# Patient Record
Sex: MALE | Race: WHITE | Employment: FULL TIME | URBAN - NONMETROPOLITAN AREA
[De-identification: names, ages, dates, MRNs, and addresses within clinical notes are randomized per-mention and may not be internally consistent; named-entity substitution may affect disease eponyms.]

---

## 2014-01-19 LAB

## 2014-05-31 LAB — HIV AG/AB: NORMAL

## 2019-05-10 LAB
AVERAGE GLUCOSE: NORMAL
CHOLESTEROL, TOTAL: 179 MG/DL
CHOLESTEROL/HDL RATIO: 2.26
HBA1C MFR BLD: 5.1 %
HDLC SERPL-MCNC: 79 MG/DL (ref 35–70)
LDL CHOLESTEROL CALCULATED: 89 MG/DL (ref 0–160)
TRIGL SERPL-MCNC: 51 MG/DL
VLDLC SERPL CALC-MCNC: 10 MG/DL

## 2019-05-15 LAB
CHOLESTEROL, TOTAL: 179 MG/DL
CHOLESTEROL/HDL RATIO: 2.26
HDLC SERPL-MCNC: 79 MG/DL (ref 35–70)
LDL CHOLESTEROL CALCULATED: 89 MG/DL (ref 0–160)
TRIGL SERPL-MCNC: 51 MG/DL
VLDLC SERPL CALC-MCNC: 10 MG/DL

## 2019-06-15 ENCOUNTER — TELEPHONE (OUTPATIENT)
Dept: PRIMARY CARE CLINIC | Age: 39
End: 2019-06-15

## 2019-06-15 NOTE — TELEPHONE ENCOUNTER
Patients wife stopped in stating that there was a form filled out for patient for his insurance and due to the way the form was filled out his life insurance was denied. Wife stated that patient does not have any health issues and was curious as to what was put on the form? There has not been any change in patients health since his prior life insurance policy was place.      Patients wife, Austin Hernandez, can be reached at   [phone]

## 2019-06-17 NOTE — TELEPHONE ENCOUNTER
I am sorry but I do not know - I would have to see the form again.  I do not understand why it was denied

## 2019-07-22 ENCOUNTER — TELEPHONE (OUTPATIENT)
Dept: FAMILY MEDICINE CLINIC | Age: 39
End: 2019-07-22

## 2019-07-22 DIAGNOSIS — R53.83 OTHER FATIGUE: ICD-10-CM

## 2019-07-22 DIAGNOSIS — Z82.49 FAM HX-ISCHEM HEART DISEASE: Primary | ICD-10-CM

## 2019-08-09 ENCOUNTER — OFFICE VISIT (OUTPATIENT)
Dept: FAMILY MEDICINE CLINIC | Age: 39
End: 2019-08-09
Payer: COMMERCIAL

## 2019-08-09 VITALS
HEART RATE: 74 BPM | TEMPERATURE: 98.2 F | BODY MASS INDEX: 25.87 KG/M2 | WEIGHT: 191 LBS | HEIGHT: 72 IN | DIASTOLIC BLOOD PRESSURE: 78 MMHG | OXYGEN SATURATION: 97 % | SYSTOLIC BLOOD PRESSURE: 138 MMHG

## 2019-08-09 DIAGNOSIS — M54.9 UPPER BACK PAIN: ICD-10-CM

## 2019-08-09 DIAGNOSIS — Z72.0 CHEWING TOBACCO USE: ICD-10-CM

## 2019-08-09 DIAGNOSIS — R07.0 THROAT PAIN: ICD-10-CM

## 2019-08-09 PROCEDURE — 99213 OFFICE O/P EST LOW 20 MIN: CPT | Performed by: INTERNAL MEDICINE

## 2019-08-09 RX ORDER — METHYLPREDNISOLONE 4 MG/1
TABLET ORAL
Qty: 1 KIT | Refills: 0 | Status: SHIPPED | OUTPATIENT
Start: 2019-08-09 | End: 2019-08-15

## 2019-08-09 ASSESSMENT — PATIENT HEALTH QUESTIONNAIRE - PHQ9
SUM OF ALL RESPONSES TO PHQ9 QUESTIONS 1 & 2: 0
SUM OF ALL RESPONSES TO PHQ QUESTIONS 1-9: 0
2. FEELING DOWN, DEPRESSED OR HOPELESS: 0
SUM OF ALL RESPONSES TO PHQ QUESTIONS 1-9: 0
1. LITTLE INTEREST OR PLEASURE IN DOING THINGS: 0

## 2019-08-09 ASSESSMENT — ENCOUNTER SYMPTOMS
BACK PAIN: 1
DIARRHEA: 0
SHORTNESS OF BREATH: 0
BLOOD IN STOOL: 0
CONSTIPATION: 0
COUGH: 0
EYE PAIN: 0
SORE THROAT: 1
CHEST TIGHTNESS: 0
VOMITING: 0
ABDOMINAL PAIN: 0
NAUSEA: 0
RHINORRHEA: 0

## 2019-08-09 NOTE — PROGRESS NOTES
DOSEPACK) 4 MG tablet, Take by mouth., Disp: 1 kit, Rfl: 0    No Known Allergies    Past Medical History:   Diagnosis Date    Shoulder dislocation, left, initial encounter     Shoulder pain, left        Past Surgical History:   Procedure Laterality Date    EXTERNAL EAR SURGERY Bilateral 2013       No family history on file. Social History     Socioeconomic History    Marital status: Single     Spouse name: Not on file    Number of children: Not on file    Years of education: Not on file    Highest education level: Not on file   Occupational History    Not on file   Social Needs    Financial resource strain: Not on file    Food insecurity:     Worry: Not on file     Inability: Not on file    Transportation needs:     Medical: Not on file     Non-medical: Not on file   Tobacco Use    Smoking status: Never Smoker    Smokeless tobacco: Current User     Types: Snuff   Substance and Sexual Activity    Alcohol use: Yes     Comment: 5-6 beers daily    Drug use: No    Sexual activity: Not on file   Lifestyle    Physical activity:     Days per week: Not on file     Minutes per session: Not on file    Stress: Not on file   Relationships    Social connections:     Talks on phone: Not on file     Gets together: Not on file     Attends [employer] service: Not on file     Active member of club or organization: Not on file     Attends meetings of clubs or organizations: Not on file     Relationship status: Not on file    Intimate partner violence:     Fear of current or ex partner: Not on file     Emotionally abused: Not on file     Physically abused: Not on file     Forced sexual activity: Not on file   Other Topics Concern    Not on file   Social History Narrative    Not on file       Vitals:    08/09/19 1617   BP: 138/78   Pulse: 74   Temp: 98.2 °F (36.8 °C)   SpO2: 97%   Weight: 191 lb (86.6 kg)   Height: 6' (1.829 m)       Exam:  Physical Exam   Constitutional: He is oriented to person, place, and time. Provider:   Ana Florez DC     Requested Specialty:   Chiropractic Medicine     Number of Visits Requested:   1       Wilda Boxer, MD  8/9/2019  5:01 PM

## 2019-08-13 ENCOUNTER — OFFICE VISIT (OUTPATIENT)
Dept: CHIROPRACTIC MEDICINE | Age: 39
End: 2019-08-13
Payer: COMMERCIAL

## 2019-08-13 VITALS
SYSTOLIC BLOOD PRESSURE: 114 MMHG | HEIGHT: 70 IN | WEIGHT: 189 LBS | BODY MASS INDEX: 27.06 KG/M2 | OXYGEN SATURATION: 98 % | TEMPERATURE: 98.4 F | HEART RATE: 63 BPM | DIASTOLIC BLOOD PRESSURE: 80 MMHG

## 2019-08-13 DIAGNOSIS — M54.04 PANNICULITIS AFFECTING REGIONS OF NECK AND BACK, THORACIC REGION: Primary | ICD-10-CM

## 2019-08-13 DIAGNOSIS — M62.830 MUSCLE SPASM OF BACK: ICD-10-CM

## 2019-08-13 PROCEDURE — 98940 CHIROPRACT MANJ 1-2 REGIONS: CPT | Performed by: CHIROPRACTOR

## 2019-08-13 PROCEDURE — 99203 OFFICE O/P NEW LOW 30 MIN: CPT | Performed by: CHIROPRACTOR

## 2019-08-13 PROCEDURE — 97014 ELECTRIC STIMULATION THERAPY: CPT | Performed by: CHIROPRACTOR

## 2019-08-13 ASSESSMENT — ENCOUNTER SYMPTOMS: BACK PAIN: 1

## 2019-08-13 NOTE — PROGRESS NOTES
Test:negative  Maximum Cervical Compression Test:negative  Seated SLRs are negative bilaterally. Kemps testing negative bilaterally. Passive hyperextension negative. Reflexes are +2 and symmetrical throughout the upper and lower extremities. He demonstrates 5/5 strength throughout the upper extremity indicators; in the lower extremities at the hip flexors, knee flexors, knee extensors and ankle dorsiflexors. Hypertonic and tender fibers in thoracic paraspinals bilaterally. Point tenderness over the costotransverse joint on the left at T5 and 6. Joint fixation noted with motion screening at [address] Thaddeus Slaughter was seen today for back pain. Diagnoses and all orders for this visit:    Panniculitis affecting regions of neck and back, thoracic region    Muscle spasm of back        Treatment Plan:   I spoke with him regarding my exam findings and treatment options. Facet capsulitis noted with myofascial pain. I recommended that we start a trial of conservative care today consisting EMS and manipulation. I will plan on seeing him 1 times per week for 4 weeks, then reassess to determine response to care and future options. With consent, I did begin today. Problem/Goals  Decrease pain and/or swelling and Increase ROM and/or flexibility    Today's Treatment  With consent, started treatment today. EMS with heat to the interscapular region for 15 minutes to address muscle spasm/hypertension and alleviate pain. And, diversified manipulation was performed to the affected segments. Introduced cow and camel exercises for him to continue at home. I want him to do 1 set of 10 in the morning and evening. I spoke to him regarding posttreatment soreness. Should any arise, he  may use ice for 10-15 minutes, over-the-counter NSAIDs or topical gels. This schedule be difficult for him to follow-up. He will therefore be seen on an as-needed basis.   Seen By:  Gopi Chakraborty DC    * This note was created using voice

## 2019-08-21 DIAGNOSIS — Z82.49 FAM HX-ISCHEM HEART DISEASE: ICD-10-CM

## 2019-08-21 DIAGNOSIS — R53.83 OTHER FATIGUE: ICD-10-CM
